# Patient Record
Sex: MALE | URBAN - METROPOLITAN AREA
[De-identification: names, ages, dates, MRNs, and addresses within clinical notes are randomized per-mention and may not be internally consistent; named-entity substitution may affect disease eponyms.]

---

## 2022-04-21 ENCOUNTER — EMERGENCY (EMERGENCY)
Facility: HOSPITAL | Age: 37
LOS: 1 days | Discharge: HOME | End: 2022-04-21
Attending: EMERGENCY MEDICINE | Admitting: EMERGENCY MEDICINE
Payer: SELF-PAY

## 2022-04-21 VITALS
DIASTOLIC BLOOD PRESSURE: 89 MMHG | WEIGHT: 179.9 LBS | HEIGHT: 66 IN | TEMPERATURE: 98 F | OXYGEN SATURATION: 96 % | SYSTOLIC BLOOD PRESSURE: 158 MMHG | RESPIRATION RATE: 18 BRPM | HEART RATE: 107 BPM

## 2022-04-21 DIAGNOSIS — F10.920 ALCOHOL USE, UNSPECIFIED WITH INTOXICATION, UNCOMPLICATED: ICD-10-CM

## 2022-04-21 PROCEDURE — 99284 EMERGENCY DEPT VISIT MOD MDM: CPT

## 2022-04-21 NOTE — ED PROVIDER NOTE - NSFOLLOWUPINSTRUCTIONS_ED_ALL_ED_FT
Alcohol Use Disorder    WHAT YOU NEED TO KNOW:    Alcohol use disorder (AUD) is problem drinking. AUD includes alcohol abuse and alcohol dependency.     DISCHARGE INSTRUCTIONS:    Seek care immediately if:     Your heart is beating faster than usual.      You have hallucinations.      You cannot remember what happens while you are drinking.      You have seizures.    Contact your healthcare provider if:     You are anxious and have nausea.      Your hands are shaky and you are sweating heavily.      You have questions or concerns about your condition or care.    Follow up with your healthcare provider as directed: Do not try to stop drinking on your own. Your healthcare provider may need to help you withdraw from alcohol safely. He may need to admit you to the hospital. You may also need any of the following treatments:    Medicines to decrease your craving for alcohol      Support groups such as Alcoholics Anonymous       Therapy from a psychiatrist or psychologist       Admission to an inpatient facility for treatment for severe AUD    Interested in discussing options to reduce your alcohol or drug use?      [facility]: [phone]   [facility] Substance Abuse Services: [phone], option #2   Methadone Maintenance & Ambulatory Opiate Detox: [phone]  [facility]: [phone]  [facility]: [phone]  [facility]: [phone]    [facility]: [phone], option #2   [facility]: [phone]    [facility]: [phone]    [facility] Intake: [phone]  [facility] Chemical Dependency/Ancillary Withdrawal: [phone]  [facility] Methadone Maintenance: [phone]    [facility]: [phone]  [facility] Addiction Treatment Services: [phone]    [facility]: 5-732-9-HOPENY    [facility] Office of Alcoholism and Substance Abuse Services (OASAS): https://www.oasas.[place].gov/providerdirectory/  [facility] for Addiction Services and Psychotherapy Interventions Research (CASPIR)  www.[facility].org     Interested in discussing options to reduce your tobacco use?    [facility] for Tobacco Control:  [phone]  [facility] QUITLINE: 0-606-KF-QUITS ([phone])    Interested in learning more about substance use?      http://rethinkingdrinking.niaaa.nih.gov   https://www.drugabuse.gov/patients-families     Learn more about opioid overdose prevention programs in [facility]:  http://www.[facility].ny.[facility]/diseases/aids/general/opioid_overdose_prevention/

## 2022-04-21 NOTE — ED ADULT TRIAGE NOTE - CHIEF COMPLAINT QUOTE
Pt BIBA for AMS. Pt states he drank alcohol earlier, was kicked out of a bar. Pt denies drug use. Pt denies falls, no visible s/s of trauma. Pt under arrest, Officer Tiffany #[id number] (from [employer] accompanying.

## 2022-04-21 NOTE — ED ADULT NURSE NOTE - CHIEF COMPLAINT QUOTE
Pt BIBA for AMS. Pt states he drank alcohol earlier, was kicked out of a bar. Pt denies drug use. Pt denies falls, no visible s/s of trauma. Pt under arrest, Officer Tiffany #[id number] (from [employer]) accompanying.

## 2022-04-21 NOTE — ED PROVIDER NOTE - CLINICAL SUMMARY MEDICAL DECISION MAKING FREE TEXT BOX
Patient here with apparent isolated EtOH intoxication. Alert, steady and uncooperative. [place] d/c with [employer].

## 2022-04-21 NOTE — ED PROVIDER NOTE - OBJECTIVE STATEMENT
36 M [place]IBE with [employer] under arrest for having an open warrant. He wouldn't leave a bar, bouncer called 911 and when police arrived they found that he had an active warrant. He seems EtOH intox and is upset and yelling at the police. He has no acute injures and no sign PMHx. He is ambulatory and has mild slurred speech.

## 2022-04-21 NOTE — ED PROVIDER NOTE - PATIENT PORTAL LINK FT
You can access the FollowMyHealth Patient Portal offered by [facility] by registering at the following website: [email or web address]. By joining [employer]’s FollowMyHealth portal, you will also be able to view your health information using other applications (apps) compatible with our system.

## 2022-04-21 NOTE — ED ADULT NURSE NOTE - OBJECTIVE STATEMENT
Pt presents to ed bib by [facility] from a bar, where bouncer called 911 due to pt not leaving. under arrest.  pt admits to etoh use, slightly slurred speech but awake and alert. yelling at [facility] officers  no trauma/injuries noted

## 2022-04-21 NOTE — ED PROVIDER NOTE - PHYSICAL EXAMINATION
Const: Moderate EtOH intox, good hygiene  ENT: Airway patent, protecting airway. Nasal mucosa clear. MMM. No scalp hematoma  Eyes: Clear bilaterally, pupils equal, round 3mm  MSK: No signs of acute trauma or injury. Gait steady  Neuro: Awake, alert, normal tone, JONES, answers all questions, slight slurred speech, gait steady.  Skin: No signs of acute trauma  Psych: Moderate EtOH intox, mostly uncooperative